# Patient Record
Sex: FEMALE | URBAN - METROPOLITAN AREA
[De-identification: names, ages, dates, MRNs, and addresses within clinical notes are randomized per-mention and may not be internally consistent; named-entity substitution may affect disease eponyms.]

---

## 2019-08-20 ENCOUNTER — APPOINTMENT (RX ONLY)
Dept: URBAN - METROPOLITAN AREA CLINIC 7 | Facility: CLINIC | Age: 24
Setting detail: DERMATOLOGY
End: 2019-08-20

## 2019-08-20 DIAGNOSIS — Z41.9 ENCOUNTER FOR PROCEDURE FOR PURPOSES OTHER THAN REMEDYING HEALTH STATE, UNSPECIFIED: ICD-10-CM

## 2019-08-20 PROCEDURE — ? FACIAL

## 2019-08-20 ASSESSMENT — LOCATION ZONE DERM: LOCATION ZONE: FACE

## 2019-08-20 ASSESSMENT — LOCATION DETAILED DESCRIPTION DERM
LOCATION DETAILED: RIGHT INFERIOR MEDIAL MALAR CHEEK
LOCATION DETAILED: LEFT INFERIOR CENTRAL MALAR CHEEK

## 2019-08-20 ASSESSMENT — LOCATION SIMPLE DESCRIPTION DERM
LOCATION SIMPLE: RIGHT CHEEK
LOCATION SIMPLE: LEFT CHEEK

## 2019-08-20 NOTE — PROCEDURE: FACIAL
Facial Steaming: steamed
Extraction Method: extractor
Comments (Sticky): Gentle cleanser, Image enzyme, vital c, and scrub, moderate extractions, jacquie well, light microdermabrasion, black mask, b5 hydrating mask, phloretin CF, Elta md
Exfoliation Type: scrub
Detail Level: Zone
Comments (Non-Sticky): Birthday present from mother

## 2019-08-20 NOTE — HPI: COSMETIC (FACIAL)
Have You Had A Facial Before?: has had a previous facial
When Outside In The Sun, Do You...: always burns, never tans
Additional History: No HSV/cold sores\\nPast Accutane use\\n

## 2019-10-29 ENCOUNTER — APPOINTMENT (RX ONLY)
Dept: URBAN - METROPOLITAN AREA CLINIC 7 | Facility: CLINIC | Age: 24
Setting detail: DERMATOLOGY
End: 2019-10-29

## 2019-10-29 DIAGNOSIS — Z41.9 ENCOUNTER FOR PROCEDURE FOR PURPOSES OTHER THAN REMEDYING HEALTH STATE, UNSPECIFIED: ICD-10-CM

## 2019-10-29 PROCEDURE — ? COSMETIC CONSULTATION: SKIN PEN

## 2019-10-29 ASSESSMENT — LOCATION SIMPLE DESCRIPTION DERM
LOCATION SIMPLE: LEFT CHEEK
LOCATION SIMPLE: RIGHT CHEEK

## 2019-10-29 ASSESSMENT — LOCATION DETAILED DESCRIPTION DERM
LOCATION DETAILED: RIGHT INFERIOR CENTRAL MALAR CHEEK
LOCATION DETAILED: LEFT CENTRAL MALAR CHEEK

## 2019-10-29 ASSESSMENT — LOCATION ZONE DERM: LOCATION ZONE: FACE
